# Patient Record
Sex: FEMALE | Race: NATIVE HAWAIIAN OR OTHER PACIFIC ISLANDER | NOT HISPANIC OR LATINO | URBAN - METROPOLITAN AREA
[De-identification: names, ages, dates, MRNs, and addresses within clinical notes are randomized per-mention and may not be internally consistent; named-entity substitution may affect disease eponyms.]

---

## 2017-11-27 ENCOUNTER — INPATIENT (INPATIENT)
Facility: HOSPITAL | Age: 32
LOS: 0 days | Discharge: HOME | DRG: 690 | End: 2017-11-28
Attending: STUDENT IN AN ORGANIZED HEALTH CARE EDUCATION/TRAINING PROGRAM | Admitting: STUDENT IN AN ORGANIZED HEALTH CARE EDUCATION/TRAINING PROGRAM
Payer: COMMERCIAL

## 2017-11-27 VITALS
TEMPERATURE: 98 F | DIASTOLIC BLOOD PRESSURE: 70 MMHG | OXYGEN SATURATION: 100 % | HEART RATE: 93 BPM | WEIGHT: 130.07 LBS | HEIGHT: 61 IN | SYSTOLIC BLOOD PRESSURE: 132 MMHG | RESPIRATION RATE: 18 BRPM

## 2017-11-27 LAB
ALBUMIN SERPL ELPH-MCNC: 4.6 G/DL — SIGNIFICANT CHANGE UP (ref 3.3–5)
ALP SERPL-CCNC: 80 U/L — SIGNIFICANT CHANGE UP (ref 40–120)
ALT FLD-CCNC: 18 U/L — SIGNIFICANT CHANGE UP (ref 10–45)
ANION GAP SERPL CALC-SCNC: 20 MMOL/L — HIGH (ref 5–17)
APPEARANCE UR: CLEAR — SIGNIFICANT CHANGE UP
AST SERPL-CCNC: 34 U/L — SIGNIFICANT CHANGE UP (ref 10–40)
BACTERIA # UR AUTO: PRESENT /HPF
BASOPHILS NFR BLD AUTO: 0.5 % — SIGNIFICANT CHANGE UP (ref 0–2)
BILIRUB SERPL-MCNC: 0.9 MG/DL — SIGNIFICANT CHANGE UP (ref 0.2–1.2)
BILIRUB UR-MCNC: NEGATIVE — SIGNIFICANT CHANGE UP
BUN SERPL-MCNC: 12 MG/DL — SIGNIFICANT CHANGE UP (ref 7–23)
CALCIUM SERPL-MCNC: 10.4 MG/DL — SIGNIFICANT CHANGE UP (ref 8.4–10.5)
CHLORIDE SERPL-SCNC: 95 MMOL/L — LOW (ref 96–108)
CO2 SERPL-SCNC: 21 MMOL/L — LOW (ref 22–31)
COLOR SPEC: YELLOW — SIGNIFICANT CHANGE UP
CREAT SERPL-MCNC: 0.85 MG/DL — SIGNIFICANT CHANGE UP (ref 0.5–1.3)
DIFF PNL FLD: (no result)
EOSINOPHIL NFR BLD AUTO: 7.6 % — HIGH (ref 0–6)
EPI CELLS # UR: (no result) /HPF (ref 0–5)
GLUCOSE SERPL-MCNC: 98 MG/DL — SIGNIFICANT CHANGE UP (ref 70–99)
GLUCOSE UR QL: 100
HCG SERPL-ACNC: <.1 MIU/ML — SIGNIFICANT CHANGE UP
HCT VFR BLD CALC: 41.8 % — SIGNIFICANT CHANGE UP (ref 34.5–45)
HGB BLD-MCNC: 13.5 G/DL — SIGNIFICANT CHANGE UP (ref 11.5–15.5)
KETONES UR-MCNC: NEGATIVE — SIGNIFICANT CHANGE UP
LEUKOCYTE ESTERASE UR-ACNC: NEGATIVE — SIGNIFICANT CHANGE UP
LIDOCAIN IGE QN: 40 U/L — SIGNIFICANT CHANGE UP (ref 7–60)
LYMPHOCYTES # BLD AUTO: 38.4 % — SIGNIFICANT CHANGE UP (ref 13–44)
MCHC RBC-ENTMCNC: 26.4 PG — LOW (ref 27–34)
MCHC RBC-ENTMCNC: 32.3 G/DL — SIGNIFICANT CHANGE UP (ref 32–36)
MCV RBC AUTO: 81.8 FL — SIGNIFICANT CHANGE UP (ref 80–100)
MONOCYTES NFR BLD AUTO: 7.1 % — SIGNIFICANT CHANGE UP (ref 2–14)
NEUTROPHILS NFR BLD AUTO: 46.4 % — SIGNIFICANT CHANGE UP (ref 43–77)
NITRITE UR-MCNC: POSITIVE
PH UR: 5.5 — SIGNIFICANT CHANGE UP (ref 5–8)
PLATELET # BLD AUTO: 331 K/UL — SIGNIFICANT CHANGE UP (ref 150–400)
POTASSIUM SERPL-MCNC: 5.2 MMOL/L — SIGNIFICANT CHANGE UP (ref 3.5–5.3)
POTASSIUM SERPL-SCNC: 5.2 MMOL/L — SIGNIFICANT CHANGE UP (ref 3.5–5.3)
PROT SERPL-MCNC: 9.3 G/DL — HIGH (ref 6–8.3)
PROT UR-MCNC: 30 MG/DL
RBC # BLD: 5.11 M/UL — SIGNIFICANT CHANGE UP (ref 3.8–5.2)
RBC # FLD: 12.7 % — SIGNIFICANT CHANGE UP (ref 10.3–16.9)
RBC CASTS # UR COMP ASSIST: > 10 /HPF
SODIUM SERPL-SCNC: 136 MMOL/L — SIGNIFICANT CHANGE UP (ref 135–145)
SP GR SPEC: 1.01 — SIGNIFICANT CHANGE UP (ref 1–1.03)
UROBILINOGEN FLD QL: 2 E.U./DL
WBC # BLD: 7.9 K/UL — SIGNIFICANT CHANGE UP (ref 3.8–10.5)
WBC # FLD AUTO: 7.9 K/UL — SIGNIFICANT CHANGE UP (ref 3.8–10.5)
WBC UR QL: < 5 /HPF — SIGNIFICANT CHANGE UP

## 2017-11-27 PROCEDURE — 93010 ELECTROCARDIOGRAM REPORT: CPT

## 2017-11-27 PROCEDURE — 99285 EMERGENCY DEPT VISIT HI MDM: CPT | Mod: 25

## 2017-11-27 PROCEDURE — 74177 CT ABD & PELVIS W/CONTRAST: CPT | Mod: 26

## 2017-11-27 RX ORDER — CEFTRIAXONE 500 MG/1
2 INJECTION, POWDER, FOR SOLUTION INTRAMUSCULAR; INTRAVENOUS ONCE
Qty: 0 | Refills: 0 | Status: COMPLETED | OUTPATIENT
Start: 2017-11-27 | End: 2017-11-27

## 2017-11-27 RX ORDER — SODIUM CHLORIDE 9 MG/ML
1000 INJECTION INTRAMUSCULAR; INTRAVENOUS; SUBCUTANEOUS ONCE
Qty: 0 | Refills: 0 | Status: COMPLETED | OUTPATIENT
Start: 2017-11-27 | End: 2017-11-27

## 2017-11-27 RX ORDER — PHENAZOPYRIDINE HCL 100 MG
0 TABLET ORAL
Qty: 0 | Refills: 0 | COMMUNITY

## 2017-11-27 RX ORDER — MORPHINE SULFATE 50 MG/1
4 CAPSULE, EXTENDED RELEASE ORAL ONCE
Qty: 0 | Refills: 0 | Status: DISCONTINUED | OUTPATIENT
Start: 2017-11-27 | End: 2017-11-27

## 2017-11-27 RX ORDER — SODIUM CHLORIDE 9 MG/ML
10001 INJECTION INTRAMUSCULAR; INTRAVENOUS; SUBCUTANEOUS ONCE
Qty: 0 | Refills: 0 | Status: DISCONTINUED | OUTPATIENT
Start: 2017-11-27 | End: 2017-11-27

## 2017-11-27 RX ORDER — HYDROMORPHONE HYDROCHLORIDE 2 MG/ML
1 INJECTION INTRAMUSCULAR; INTRAVENOUS; SUBCUTANEOUS ONCE
Qty: 0 | Refills: 0 | Status: DISCONTINUED | OUTPATIENT
Start: 2017-11-27 | End: 2017-11-27

## 2017-11-27 RX ORDER — ONDANSETRON 8 MG/1
4 TABLET, FILM COATED ORAL ONCE
Qty: 0 | Refills: 0 | Status: COMPLETED | OUTPATIENT
Start: 2017-11-27 | End: 2017-11-27

## 2017-11-27 RX ADMIN — MORPHINE SULFATE 4 MILLIGRAM(S): 50 CAPSULE, EXTENDED RELEASE ORAL at 19:59

## 2017-11-27 RX ADMIN — CEFTRIAXONE 100 GRAM(S): 500 INJECTION, POWDER, FOR SOLUTION INTRAMUSCULAR; INTRAVENOUS at 21:53

## 2017-11-27 RX ADMIN — SODIUM CHLORIDE 1000 MILLILITER(S): 9 INJECTION INTRAMUSCULAR; INTRAVENOUS; SUBCUTANEOUS at 21:53

## 2017-11-27 RX ADMIN — MORPHINE SULFATE 4 MILLIGRAM(S): 50 CAPSULE, EXTENDED RELEASE ORAL at 20:51

## 2017-11-27 RX ADMIN — SODIUM CHLORIDE 1000 MILLILITER(S): 9 INJECTION INTRAMUSCULAR; INTRAVENOUS; SUBCUTANEOUS at 19:59

## 2017-11-27 RX ADMIN — HYDROMORPHONE HYDROCHLORIDE 1 MILLIGRAM(S): 2 INJECTION INTRAMUSCULAR; INTRAVENOUS; SUBCUTANEOUS at 22:01

## 2017-11-27 RX ADMIN — ONDANSETRON 4 MILLIGRAM(S): 8 TABLET, FILM COATED ORAL at 19:58

## 2017-11-27 NOTE — ED PROVIDER NOTE - OBJECTIVE STATEMENT
31 yo female with hx of  chronic constipation recently seen and eval at [facility] in [place] 2 days ago- CT showed pyelo apparently per pt- she was offered admission but refused- was placed on Bactrim and discharged- continues to have increased abd pain over the past 2 days- pos flatus  - last BM 3-4 days ago- nausea  no vomiting

## 2017-11-27 NOTE — ED ADULT TRIAGE NOTE - CHIEF COMPLAINT QUOTE
Pt CO Abd Pain 10/10 worsening since Saturday.  Pt states "I was seen in another ER and told I have a UTI, and placed on Abx, but now its significantly worse."  Pt teary eyed during interview.  Pt denies N/V/D, SOB, Fevers, Dizziness at this time.  Pt self medicated with 3,500 mg PO Tylenol today with no change in pain.

## 2017-11-27 NOTE — ED PROVIDER NOTE - MEDICAL DECISION MAKING DETAILS
31 yo F with recent dx pyelo with increased right flank pain x 24 hr seen at [facility] now with increased pain await CT ? stone worsening pyelo

## 2017-11-28 VITALS
TEMPERATURE: 98 F | SYSTOLIC BLOOD PRESSURE: 97 MMHG | HEART RATE: 66 BPM | RESPIRATION RATE: 18 BRPM | OXYGEN SATURATION: 97 % | DIASTOLIC BLOOD PRESSURE: 58 MMHG

## 2017-11-28 DIAGNOSIS — N28.9 DISORDER OF KIDNEY AND URETER, UNSPECIFIED: ICD-10-CM

## 2017-11-28 DIAGNOSIS — M54.9 DORSALGIA, UNSPECIFIED: ICD-10-CM

## 2017-11-28 DIAGNOSIS — K59.00 CONSTIPATION, UNSPECIFIED: ICD-10-CM

## 2017-11-28 DIAGNOSIS — N30.00 ACUTE CYSTITIS WITHOUT HEMATURIA: ICD-10-CM

## 2017-11-28 DIAGNOSIS — N12 TUBULO-INTERSTITIAL NEPHRITIS, NOT SPECIFIED AS ACUTE OR CHRONIC: ICD-10-CM

## 2017-11-28 DIAGNOSIS — R63.8 OTHER SYMPTOMS AND SIGNS CONCERNING FOOD AND FLUID INTAKE: ICD-10-CM

## 2017-11-28 DIAGNOSIS — D72.1 EOSINOPHILIA: ICD-10-CM

## 2017-11-28 DIAGNOSIS — R01.1 CARDIAC MURMUR, UNSPECIFIED: ICD-10-CM

## 2017-11-28 DIAGNOSIS — E87.2 ACIDOSIS: ICD-10-CM

## 2017-11-28 DIAGNOSIS — Z29.9 ENCOUNTER FOR PROPHYLACTIC MEASURES, UNSPECIFIED: ICD-10-CM

## 2017-11-28 LAB
ANION GAP SERPL CALC-SCNC: 15 MMOL/L — SIGNIFICANT CHANGE UP (ref 5–17)
APPEARANCE UR: CLEAR — SIGNIFICANT CHANGE UP
BILIRUB UR-MCNC: NEGATIVE — SIGNIFICANT CHANGE UP
BUN SERPL-MCNC: 8 MG/DL — SIGNIFICANT CHANGE UP (ref 7–23)
CALCIUM SERPL-MCNC: 8.8 MG/DL — SIGNIFICANT CHANGE UP (ref 8.4–10.5)
CHLORIDE SERPL-SCNC: 101 MMOL/L — SIGNIFICANT CHANGE UP (ref 96–108)
CO2 SERPL-SCNC: 22 MMOL/L — SIGNIFICANT CHANGE UP (ref 22–31)
COLOR SPEC: YELLOW — SIGNIFICANT CHANGE UP
CREAT SERPL-MCNC: 0.85 MG/DL — SIGNIFICANT CHANGE UP (ref 0.5–1.3)
DIFF PNL FLD: (no result)
GLUCOSE SERPL-MCNC: 99 MG/DL — SIGNIFICANT CHANGE UP (ref 70–99)
GLUCOSE UR QL: NEGATIVE — SIGNIFICANT CHANGE UP
HCT VFR BLD CALC: 35.9 % — SIGNIFICANT CHANGE UP (ref 34.5–45)
HGB BLD-MCNC: 11.6 G/DL — SIGNIFICANT CHANGE UP (ref 11.5–15.5)
KETONES UR-MCNC: NEGATIVE — SIGNIFICANT CHANGE UP
LEUKOCYTE ESTERASE UR-ACNC: NEGATIVE — SIGNIFICANT CHANGE UP
MAGNESIUM SERPL-MCNC: 2.4 MG/DL — SIGNIFICANT CHANGE UP (ref 1.6–2.6)
MCHC RBC-ENTMCNC: 26.9 PG — LOW (ref 27–34)
MCHC RBC-ENTMCNC: 32.3 G/DL — SIGNIFICANT CHANGE UP (ref 32–36)
MCV RBC AUTO: 83.1 FL — SIGNIFICANT CHANGE UP (ref 80–100)
NITRITE UR-MCNC: POSITIVE
PH UR: 5 — SIGNIFICANT CHANGE UP (ref 5–8)
PLATELET # BLD AUTO: 318 K/UL — SIGNIFICANT CHANGE UP (ref 150–400)
POTASSIUM SERPL-MCNC: 4.2 MMOL/L — SIGNIFICANT CHANGE UP (ref 3.5–5.3)
POTASSIUM SERPL-SCNC: 4.2 MMOL/L — SIGNIFICANT CHANGE UP (ref 3.5–5.3)
PROT UR-MCNC: NEGATIVE MG/DL — SIGNIFICANT CHANGE UP
RBC # BLD: 4.32 M/UL — SIGNIFICANT CHANGE UP (ref 3.8–5.2)
RBC # FLD: 12.7 % — SIGNIFICANT CHANGE UP (ref 10.3–16.9)
SODIUM SERPL-SCNC: 138 MMOL/L — SIGNIFICANT CHANGE UP (ref 135–145)
SP GR SPEC: <=1.005 — SIGNIFICANT CHANGE UP (ref 1–1.03)
UROBILINOGEN FLD QL: 1 E.U./DL — SIGNIFICANT CHANGE UP
WBC # BLD: 7 K/UL — SIGNIFICANT CHANGE UP (ref 3.8–10.5)
WBC # FLD AUTO: 7 K/UL — SIGNIFICANT CHANGE UP (ref 3.8–10.5)

## 2017-11-28 PROCEDURE — 96374 THER/PROPH/DIAG INJ IV PUSH: CPT | Mod: XU

## 2017-11-28 PROCEDURE — 85025 COMPLETE CBC W/AUTO DIFF WBC: CPT

## 2017-11-28 PROCEDURE — 80053 COMPREHEN METABOLIC PANEL: CPT

## 2017-11-28 PROCEDURE — 85027 COMPLETE CBC AUTOMATED: CPT

## 2017-11-28 PROCEDURE — 83690 ASSAY OF LIPASE: CPT

## 2017-11-28 PROCEDURE — 84702 CHORIONIC GONADOTROPIN TEST: CPT

## 2017-11-28 PROCEDURE — 80048 BASIC METABOLIC PNL TOTAL CA: CPT

## 2017-11-28 PROCEDURE — 87086 URINE CULTURE/COLONY COUNT: CPT

## 2017-11-28 PROCEDURE — 87040 BLOOD CULTURE FOR BACTERIA: CPT

## 2017-11-28 PROCEDURE — 96375 TX/PRO/DX INJ NEW DRUG ADDON: CPT

## 2017-11-28 PROCEDURE — 93005 ELECTROCARDIOGRAM TRACING: CPT

## 2017-11-28 PROCEDURE — 99223 1ST HOSP IP/OBS HIGH 75: CPT | Mod: GC

## 2017-11-28 PROCEDURE — 81001 URINALYSIS AUTO W/SCOPE: CPT

## 2017-11-28 PROCEDURE — 83735 ASSAY OF MAGNESIUM: CPT

## 2017-11-28 PROCEDURE — 36415 COLL VENOUS BLD VENIPUNCTURE: CPT

## 2017-11-28 PROCEDURE — 12345: CPT | Mod: GC,NC

## 2017-11-28 PROCEDURE — 99285 EMERGENCY DEPT VISIT HI MDM: CPT | Mod: 25

## 2017-11-28 PROCEDURE — 80076 HEPATIC FUNCTION PANEL: CPT

## 2017-11-28 PROCEDURE — 74177 CT ABD & PELVIS W/CONTRAST: CPT

## 2017-11-28 RX ORDER — NITROFURANTOIN MACROCRYSTAL 50 MG
100 CAPSULE ORAL EVERY 12 HOURS
Qty: 0 | Refills: 0 | Status: DISCONTINUED | OUTPATIENT
Start: 2017-11-28 | End: 2017-11-28

## 2017-11-28 RX ORDER — KETOROLAC TROMETHAMINE 30 MG/ML
15 SYRINGE (ML) INJECTION EVERY 6 HOURS
Qty: 0 | Refills: 0 | Status: DISCONTINUED | OUTPATIENT
Start: 2017-11-28 | End: 2017-11-28

## 2017-11-28 RX ORDER — LIDOCAINE 4 G/100G
1 CREAM TOPICAL ONCE
Qty: 0 | Refills: 0 | Status: COMPLETED | OUTPATIENT
Start: 2017-11-28 | End: 2017-11-28

## 2017-11-28 RX ORDER — SENNA PLUS 8.6 MG/1
2 TABLET ORAL AT BEDTIME
Qty: 0 | Refills: 0 | Status: DISCONTINUED | OUTPATIENT
Start: 2017-11-28 | End: 2017-11-28

## 2017-11-28 RX ORDER — AZTREONAM 2 G
1 VIAL (EA) INJECTION
Qty: 0 | Refills: 0 | COMMUNITY

## 2017-11-28 RX ORDER — CEFTRIAXONE 500 MG/1
1 INJECTION, POWDER, FOR SOLUTION INTRAMUSCULAR; INTRAVENOUS EVERY 24 HOURS
Qty: 0 | Refills: 0 | Status: DISCONTINUED | OUTPATIENT
Start: 2017-11-28 | End: 2017-11-28

## 2017-11-28 RX ORDER — MULTIVIT WITH MIN/MFOLATE/K2 340-15/3 G
100 POWDER (GRAM) ORAL DAILY
Qty: 0 | Refills: 0 | Status: DISCONTINUED | OUTPATIENT
Start: 2017-11-28 | End: 2017-11-28

## 2017-11-28 RX ORDER — POLYETHYLENE GLYCOL 3350 17 G/17G
17 POWDER, FOR SOLUTION ORAL EVERY 12 HOURS
Qty: 0 | Refills: 0 | Status: DISCONTINUED | OUTPATIENT
Start: 2017-11-28 | End: 2017-11-28

## 2017-11-28 RX ORDER — NORETHINDRONE AND ETHINYL ESTRADIOL 0.4-0.035
1 KIT ORAL
Qty: 0 | Refills: 0 | COMMUNITY

## 2017-11-28 RX ORDER — POLYETHYLENE GLYCOL 3350 17 G/17G
17 POWDER, FOR SOLUTION ORAL
Qty: 3 | Refills: 1 | OUTPATIENT
Start: 2017-11-28 | End: 2018-01-26

## 2017-11-28 RX ADMIN — Medication 1 TABLET(S): at 15:34

## 2017-11-28 RX ADMIN — SENNA PLUS 2 TABLET(S): 8.6 TABLET ORAL at 02:14

## 2017-11-28 RX ADMIN — Medication 15 MILLIGRAM(S): at 11:30

## 2017-11-28 RX ADMIN — Medication 5 MILLIGRAM(S): at 11:30

## 2017-11-28 RX ADMIN — Medication 100 MILLILITER(S): at 02:44

## 2017-11-28 RX ADMIN — HYDROMORPHONE HYDROCHLORIDE 1 MILLIGRAM(S): 2 INJECTION INTRAMUSCULAR; INTRAVENOUS; SUBCUTANEOUS at 00:00

## 2017-11-28 NOTE — H&P ADULT - PROBLEM SELECTOR PLAN 5
IMPROVE 0, low risk for VTE, can use SCDs     FULL CODE seen on CT; will need further evaluation as outpatient with repeat renal ultrasound

## 2017-11-28 NOTE — H&P ADULT - PROBLEM SELECTOR PLAN 3
Regular diet  Replete lytes PRN (Mg>2, K>4) Pt with chronic constipation that has been worked up in the past. Passing flatus, last BM this AM. CT A/P showing copious colonic stool with small bowel fecalization compatible with constipation/stasis.  - Mag citrate and senna daily  - will add additional agents (dulcolax, miralax if pt continues to be constipated)

## 2017-11-28 NOTE — H&P ADULT - PMH
Chronic idiopathic constipation    UTI (urinary tract infection) Chronic idiopathic constipation    Gastroesophageal reflux disease, esophagitis presence not specified    Murmur, cardiac    UTI (urinary tract infection)

## 2017-11-28 NOTE — H&P ADULT - NSHPLABSRESULTS_GEN_ALL_CORE
.  LABS:                         13.5   7.9   )-----------( 331      ( 2017 20:02 )             41.8         136  |  95<L>  |  12  ----------------------------<  98  5.2   |  21<L>  |  0.85    Ca    10.4      2017 20:02    TPro  9.3<H>  /  Alb  4.6  /  TBili  0.9  /  DBili  x   /  AST  34  /  ALT  18  /  AlkPhos  80        Urinalysis Basic - ( 2017 21:03 )    Color: Yellow / Appearance: Clear / S.010 / pH: x  Gluc: x / Ketone: NEGATIVE  / Bili: Negative / Urobili: 2.0 E.U./dL   Blood: x / Protein: 30 mg/dL / Nitrite: POSITIVE   Leuk Esterase: NEGATIVE / RBC: > 10 /HPF / WBC < 5 /HPF   Sq Epi: x / Non Sq Epi: Moderate /HPF / Bacteria: Present /HPF        RADIOLOGY, EKG & ADDITIONAL TESTS: Reviewed.     CT A/P:     FINDINGS:    Lower chest: Clear lung bases.     Liver: Smooth in contour. No focal mass. Portal and hepatic veins are   patent.    Biliary system: Gallbladder is normal in size. No calcified gallstones.   No biliary ductal dilatation.    Pancreas: Unremarkable.    Spleen: Unremarkable.    Adrenal glands: Unremarkable.    Kidneys: Symmetric parenchymal enhancement without CT evidence of acute   pyelonephritis. There is a 1.0 cm heterogeneously hypodense lesion at the   upper pole left kidney. Multiple subcentimeter hypoattenuating lesions in   both kidneys are too small to further characterize. No hydronephrosis. No   renal or ureteral stone.     Urinary Bladder: Unremarkable.  Reproductive organs: Unremarkable.     Bowel/Peritoneum: Somewhat limited due to lack of oral contrast. Copious   colonic stool with small bowel fecalization compatible with   constipation/stasis. No evidence of obstruction. No appreciable wall   thickening. The appendix is not clearly identified however no dilated   tubular structures are seen within the right lower quadrant to suggest   acute appendicitis. No ascites or extraluminal gas.    Lymph nodes: No lymphadenopathy.  Aorta/IVC: Normal caliber.  Abdominal wall: Small fat-containing umbilical hernia.  Bones/Soft tissues: No acute abnormality.      IMPRESSION:   1.  No CT evidence of acute pyelonephritis, nephrolithiasis or   hydronephrosis.  2.  1.0 cm heterogeneously hypodense lesion of the upper pole left   kidney. Further evaluation with nonemergent ultrasound or   contrast-enhanced MRI is recommended.  3.  Copious colonic stool with small bowel fecalization compatible with   constipation/stasis.

## 2017-11-28 NOTE — DISCHARGE NOTE ADULT - OTHER SIGNIFICANT FINDINGS
CT Abdomen and Pelvis w/ IV Cont (11.27.17 @ 21:29) >  1.  No nephrolithiasis or hydronephrosis. No CT evidence of   pyelonephritis.  2.  1.0 cm heterogeneous hypodense lesion in the left kidney. Further   evaluation with nonemergent ultrasound or contrast-enhanced MRI is   recommended.  3.  Copious right-sided colonic stool with small bowel fecalization   suggestive of constipation/stasis.

## 2017-11-28 NOTE — DISCHARGE NOTE ADULT - PLAN OF CARE
Please continue to take bactrim for a total of 10 days You came to hospital with increased urinary frequency and urgency. You have uncomplicated urinary tract infection. There is no signs of pyelonephritis: inflammation of the kidneys. Please continue to take bactrim for a total of 10 days as you have been doing. Please follow-up with your gastroenterologist You came to the hospital with right flank pain. There is no signs of pyelonephritis: inflammation of the kidneys. CT scan of your abdomen showed copious amount of stool in the right sided colon, likely contributing the pain. Please follow-up with your gastroenterologist. A 1.0 cm heterogeneous hypodense lesion in the left kidney was incidentally found on your CT scan. An ultrasound or contrast-enhanced MRI is recommended. Please follow-up with your primary care doctor for further management. You came to hospital with increased urinary frequency and urgency. You have uncomplicated urinary tract infection. There is no signs of pyelonephritis: inflammation of the kidneys. Please continue to take bactrim 800/160 twice daily for a total of 10 days as you have been doing.

## 2017-11-28 NOTE — PROGRESS NOTE ADULT - PROBLEM SELECTOR PLAN 4
-possibly drug induced given bactrim ds use;   -monitor CBC -resolved  -possibly drug induced given bactrim ds use;   -monitor CBC

## 2017-11-28 NOTE — PROGRESS NOTE ADULT - PROBLEM SELECTOR PLAN 1
-history consistent with pyelonephritis with UTI and CVA tenderness but pt met 0/4 SIRS criteria, WBC wnl, UA negative and CT A/P preliminary read is negative for acute pyelonephritis, kidney stones, or hydronephrosis.   -DDx: pylenonephritis, ectopic pregenancy, appendicitis, choleycystitis   -no DM, pregnancy, MOHAN/CKD, obstruction, catheter or immunocompromise   -previously had UTI in October treated with cipro and currently on bactrim  -UC from 11/25 at [facility] [facility], CT grew staph saprophyticus and it was sensitive to bactrim, vancomycin, tetracycline and nitrofurantoin.  -will continue to treat with Ceftriaxone 1g q 24hrs for suspicion of pyelonephritis  - will treat pain with Toradol 15mg IV q6hrs PRN, will avoid opioids in setting of constipation  -f/u Urine Cx from [facility] for speciation and sensitivities  -will switch to bactrim DS q12 upon discharge -patient has uncomplicated UTI  -WBC wnl, UA negative and CT A/P preliminary read is negative for acute pyelonephritis, kidney stones, or hydronephrosis.   -DDx: UTI, pyelonephritis, ectopic pregenancy, appendicitis, choleycystitis   -no DM, pregnancy, MOHAN/CKD, obstruction, catheter or immunocompromise   -previously had UTI in October treated with cipro and currently on bactrim  -UC from 11/25 at [facility], CT grew staph saprophyticus and it was sensitive to bactrim, vancomycin, tetracycline and nitrofurantoin.  -will continue to treat with Ceftriaxone 1g q 24hrs for suspicion of pyelonephritis  - will treat pain with Toradol 15mg IV q6hrs PRN, will avoid opioids in setting of constipation  -f/u Urine Cx from [facility] for speciation and sensitivities  -will switch to bactrim DS q12 upon discharge

## 2017-11-28 NOTE — DISCHARGE NOTE ADULT - CARE PLAN
Principal Discharge DX:	Acute cystitis without hematuria  Goal:	Please continue to take bactrim for a total of 10 days  Instructions for follow-up, activity and diet:	You came to hospital with increased urinary frequency and urgency. You have uncomplicated urinary tract infection. There is no signs of pyelonephritis: inflammation of the kidneys. Please continue to take bactrim for a total of 10 days as you have been doing.  Secondary Diagnosis:	Constipation, unspecified constipation type  Goal:	Please follow-up with your gastroenterologist  Instructions for follow-up, activity and diet:	You came to the hospital with right flank pain. There is no signs of pyelonephritis: inflammation of the kidneys. CT scan of your abdomen showed copious amount of stool in the right sided colon, likely contributing the pain. Please follow-up with your gastroenterologist. Principal Discharge DX:	Acute cystitis without hematuria  Goal:	Please continue to take bactrim for a total of 10 days  Instructions for follow-up, activity and diet:	You came to hospital with increased urinary frequency and urgency. You have uncomplicated urinary tract infection. There is no signs of pyelonephritis: inflammation of the kidneys. Please continue to take bactrim for a total of 10 days as you have been doing.  Secondary Diagnosis:	Constipation, unspecified constipation type  Goal:	Please follow-up with your gastroenterologist  Instructions for follow-up, activity and diet:	You came to the hospital with right flank pain. There is no signs of pyelonephritis: inflammation of the kidneys. CT scan of your abdomen showed copious amount of stool in the right sided colon, likely contributing the pain. Please follow-up with your gastroenterologist.  Secondary Diagnosis:	Kidney lesion, native, left  Instructions for follow-up, activity and diet:	A 1.0 cm heterogeneous hypodense lesion in the left kidney was incidentally found on your CT scan. An ultrasound or contrast-enhanced MRI is recommended. Please follow-up with your primary care doctor for further management. Principal Discharge DX:	Acute cystitis without hematuria  Goal:	Please continue to take bactrim for a total of 10 days  Instructions for follow-up, activity and diet:	You came to hospital with increased urinary frequency and urgency. You have uncomplicated urinary tract infection. There is no signs of pyelonephritis: inflammation of the kidneys. Please continue to take bactrim 800/160 twice daily for a total of 10 days as you have been doing.  Secondary Diagnosis:	Constipation, unspecified constipation type  Goal:	Please follow-up with your gastroenterologist  Instructions for follow-up, activity and diet:	You came to the hospital with right flank pain. There is no signs of pyelonephritis: inflammation of the kidneys. CT scan of your abdomen showed copious amount of stool in the right sided colon, likely contributing the pain. Please follow-up with your gastroenterologist.  Secondary Diagnosis:	Kidney lesion, native, left  Instructions for follow-up, activity and diet:	A 1.0 cm heterogeneous hypodense lesion in the left kidney was incidentally found on your CT scan. An ultrasound or contrast-enhanced MRI is recommended. Please follow-up with your primary care doctor for further management.

## 2017-11-28 NOTE — H&P ADULT - NSHPSOCIALHISTORY_GEN_ALL_CORE
Never smoker  Drinks 1 alcoholic every 2-3 weeks  No illicit drug use Never smoker  Drinks 1 alcoholic every 2-3 weeks  No illicit drug use  lives with , works as a

## 2017-11-28 NOTE — DISCHARGE NOTE ADULT - CARE PROVIDER_API CALL
Hollis Sotelo), Medicine  [address]   [address]  [place] [address]  Phone: [phone]  Fax: [phone]    Hubert Ann,   [address] at [address] [place] [address]  Phone: [phone]  Fax: (   )    -

## 2017-11-28 NOTE — H&P ADULT - ASSESSMENT
FINDINGS:    Lower chest: Clear lung bases.     Liver: Smooth in contour. No focal mass. Portal and hepatic veins are   patent.    Biliary system: Gallbladder is normal in size. No calcified gallstones.   No biliary ductal dilatation.    Pancreas: Unremarkable.    Spleen: Unremarkable.    Adrenal glands: Unremarkable.    Kidneys: Symmetric parenchymal enhancement without CT evidence of acute   pyelonephritis. There is a 1.0 cm heterogeneously hypodense lesion at the   upper pole left kidney. Multiple subcentimeter hypoattenuating lesions in   both kidneys are too small to further characterize. No hydronephrosis. No   renal or ureteral stone.     Urinary Bladder: Unremarkable.    Reproductive organs: Unremarkable.     Bowel/Peritoneum: Somewhat limited due to lack of oral contrast. Copious   colonic stool with small bowel fecalization compatible with   constipation/stasis. No evidence of obstruction. No appreciable wall   thickening. The appendix is not clearly identified however no dilated   tubular structures are seen within the right lower quadrant to suggest   acute appendicitis. No ascites or extraluminal gas.    Lymph nodes: No lymphadenopathy.    Aorta/IVC: Normal caliber.    Abdominal wall: Small fat-containing umbilical hernia.    Bones/Soft tissues: No acute abnormality.        IMPRESSION:   1.  No CT evidence of acute pyelonephritis, nephrolithiasis or   hydronephrosis.  2.  1.0 cm heterogeneously hypodense lesion of the upper pole left   kidney. Further evaluation with nonemergent ultrasound or   contrast-enhanced MRI is recommended.  3.  Copious colonic stool with small bowel fecalization compatible with   constipation/stasis.

## 2017-11-28 NOTE — PROGRESS NOTE ADULT - PROBLEM SELECTOR PLAN 6
-recently dxd per patient; will check EKG  -follow up w/ PCP /outside cardiologist for further evaluation

## 2017-11-28 NOTE — PROGRESS NOTE ADULT - PROBLEM SELECTOR PLAN 3
-Pt with chronic constipation that has been worked up in the past. Passing flatus, last BM yesterday. CT A/P showing copious colonic stool with small bowel fecalization compatible with constipation/stasis.  -c/w Mag citrate and senna daily  -added miralax today  - will add additional agents (dulcolax, miralax if pt continues to be constipated) -resolved  -in setting of decreased PO intake s/p IVF boluses in ED; monitor BMP

## 2017-11-28 NOTE — H&P ADULT - NSHPPHYSICALEXAM_GEN_ALL_CORE
.  VITAL SIGNS:  T(C): 36.7 (11-28-17 @ 01:25), Max: 36.8 (11-27-17 @ 20:06)  T(F): 98 (11-28-17 @ 01:25), Max: 98.3 (11-27-17 @ 20:06)  HR: 72 (11-28-17 @ 01:25) (72 - 93)  BP: 111/70 (11-28-17 @ 01:25) (105/67 - 132/70)  BP(mean): --  RR: 18 (11-28-17 @ 01:25) (18 - 18)  SpO2: 98% (11-28-17 @ 01:25) (95% - 100%)  Wt(kg): --    PHYSICAL EXAM:    Constitutional: WDWN resting comfortably in bed; NAD  Head: NC/AT  Eyes: PERRLA, EOMI, clear conjunctiva  ENT: no nasal discharge; no oropharyngeal erythema or exudates; dry lips, moist oral mucosa  Neck: supple; no JVD or thyromegaly  Respiratory: CTA B/L; no W/R/R, no retractions  Cardiac: RRR, split S1, normal S2; no M/R/G; PMI non-displaced  Gastrointestinal: soft, distended, tympanic, discomfort with palpation but nontender; no rebound or guarding; +BS, no hepatosplenomegaly  Back: bilateral CVA  tenderness, R>L  Extremities: WWP, no clubbing or cyanosis; no peripheral edema  Vascular: 2+ radial, DP/PT pulses B/L  Lymphatic: no submandibular or cervical LAD  Neurologic: AAOx3; answers questions appropriately, follows commands, moves all extremities. Strength intact, 5/5 in UE and LE b/l, sensation intact

## 2017-11-28 NOTE — H&P ADULT - FAMILY HISTORY
Father  Still living? Unknown  Family history of diabetes mellitus in father, Age at diagnosis: Age Unknown  Family history of CABG, Age at diagnosis: Age Unknown  Family history of heart failure, Age at diagnosis: Age Unknown     Sibling  Still living? Unknown  Family history of renal failure, Age at diagnosis: Age Unknown  Family history of kidney stones, Age at diagnosis: Age Unknown

## 2017-11-28 NOTE — PROGRESS NOTE ADULT - SUBJECTIVE AND OBJECTIVE BOX
INTERVAL HPI/OVERNIGHT EVENTS: Admitted overnight. Pt was at [facility] ED last Saturday 11/25 with sepsis 2/2 UTI. UC grew staph saprophyticus and it was sensitive to bactrim, vancomycin, tetracycline and nitrofurantoin. CT A/P unremarkable. Pt was sent home with bactrim and phenazopyridine. Patient had a UTI last Octrober which was treated with ciprofloxacin, but didn't follow-up with her PMD afterwards.       SUBJECTIVE: Patient seen and examined at bedside. Last BM on Monday. Voiding well, urgency and frequency improved. No burning sensation. Reports chills, nausea, right flank pain and right sided abdominal pain. No fever, SOB or chest pain.      OBJECTIVE:  VITAL SIGNS:  ICU Vital Signs Last 24 Hrs  T(C): 36.7 (28 Nov 2017 05:56), Max: 36.8 (27 Nov 2017 20:06)  T(F): 98.1 (28 Nov 2017 05:56), Max: 98.3 (27 Nov 2017 20:06)  HR: 79 (28 Nov 2017 05:56) (72 - 93)  BP: 112/74 (28 Nov 2017 05:56) (105/67 - 132/70)  BP(mean): --  ABP: --  ABP(mean): --  RR: 18 (28 Nov 2017 05:56) (18 - 18)  SpO2: 98% (28 Nov 2017 05:56) (95% - 100%)    CAPILLARY BLOOD GLUCOSE    PHYSICAL EXAM:  General: NAD  HEENT: NC/AT; PERRL, clear conjunctiva, moist mucosa  Neck: supple  Respiratory: CTA b/l  Cardiovascular: +S1/S2; RRR  Abdomen: soft, ND; +BS x4. Diffuse tenderness on the right side of the abdomen.   Extremities: WWP, 2+ peripheral pulses b/l; no LE edema  Skin: normal color and turgor; no rash  Neurological: AAOx3, CN ii-xii intact, no focal deficits    MEDICATIONS:  MEDICATIONS  (STANDING):  cefTRIAXone   IVPB 1 Gram(s) IV Intermittent every 24 hours  magnesium citrate Solution 100 milliLiter(s) Oral daily  senna 2 Tablet(s) Oral at bedtime    MEDICATIONS  (PRN):  ketorolac   Injectable 15 milliGRAM(s) IV Push every 6 hours PRN Severe Pain (7 - 10)      ALLERGIES:  Allergies    No Known Allergies    Intolerances        LABS:                        11.6   7.0   )-----------( 318      ( 28 Nov 2017 06:05 )             35.9     11-28    138  |  101  |  8   ----------------------------<  99  4.2   |  22  |  0.85    Ca    8.8      28 Nov 2017 06:05  Mg     2.4     11-28    TPro  9.3<H>  /  Alb  4.6  /  TBili  0.9  /  DBili  x   /  AST  34  /  ALT  18  /  AlkPhos  80  11-27      Urinalysis Basic - ( 28 Nov 2017 01:23 )    Color: Yellow / Appearance: Clear / SG: <=1.005 / pH: x  < from: CT Abdomen and Pelvis w/ IV Cont (11.27.17 @ 21:29) >  FINDINGS:    Lower chest: Clear lung bases.     Liver: Smooth in contour. No focal lesion. Nonspecific mild heterogeneous   attenuation of right hepatic lobe and left hepatic dome is likely related   to phase of imaging. Proximal hepatic veins are patent. Portal veins are   patent.    Biliary system: Gallbladder is normal in size. No calcified gallstones.   No biliary ductal dilatation.    Pancreas: Unremarkable.    Spleen: Unremarkable.    Adrenal glands: Unremarkable.    Kidneys: Symmetric and homogeneous parenchymal enhancement. No   perinephric fat stranding. 1.0 cm heterogeneous hypodense lesion in the   left upper pole. Multiple subcentimeter hypoattenuating lesions in left   greater than right kidneys are too small to further characterize. No   hydronephrosis. No renal or ureteral stone.     Urinary Bladder: Unremarkable.    Reproductive organs: Unremarkable.     Bowel/Peritoneum: Somewhat limited due to lack of oral contrast. Normal   bowel caliber. No appreciable colonic wall thickening. Normal appendix.   Copious right-sided colonic stool with fecalization of multiple distal   small bowel loops. No ascites or extraluminal gas.    Lymph nodes: No lymphadenopathy.    Aorta/IVC: Normal caliber.    Bones/Soft tissues: No significant abnormality. Mild thoracolumbar   levocurvature.      IMPRESSION:   1.  No nephrolithiasis or hydronephrosis. No CT evidence of   pyelonephritis.  2.  1.0 cm heterogeneous hypodense lesion in the left kidney. Further   evaluation with nonemergent ultrasound or contrast-enhanced MRI is   recommended.  3.  Copious right-sided colonic stool with small bowel fecalization   suggestive of constipation/stasis.    < end of copied text >  Gluc: x / Ketone: NEGATIVE  / Bili: Negative / Urobili: 1.0 E.U./dL   Blood: x / Protein: NEGATIVE mg/dL / Nitrite: POSITIVE   Leuk Esterase: NEGATIVE / RBC: 5-10 /HPF / WBC < 5 /HPF   Sq Epi: x / Non Sq Epi: Moderate /HPF / Bacteria: Present /HPF        RADIOLOGY & ADDITIONAL TESTS: Reviewed.

## 2017-11-28 NOTE — DISCHARGE NOTE ADULT - PATIENT PORTAL LINK FT
“You can access the [facility] Patient Portal, offered by [facility], by registering with the following website: [email or web address]”

## 2017-11-28 NOTE — H&P ADULT - NSHPREVIEWOFSYSTEMS_GEN_ALL_CORE
REVIEW OF SYSTEMS:    CONSTITUTIONAL: Intermittent chills, No weakness or fevers   EYES/ENT: No visual changes;  No vertigo or throat pain   NECK: No pain or stiffness  RESPIRATORY: No cough, wheezing, hemoptysis; No shortness of breath  CARDIOVASCULAR: No chest pain or palpitations  GASTROINTESTINAL: Abdominal discomfort and chronic constipation. No epigastric pain. No nausea, vomiting, or hematemesis; No melena or hematochezia.  GENITOURINARY: Increased frequency and urgency. No dysuria or hematuria  NEUROLOGICAL: No numbness or weakness  SKIN: No itching, burning, rashes, or lesions   All other review of systems is negative unless indicated above.

## 2017-11-28 NOTE — DISCHARGE NOTE ADULT - HOSPITAL COURSE
33 yo F with PMHx of chronic constipation, UTI presents to ED with worsening back pain, increased urinary frequency and urgency, chills x 3 days.     It is unclear whether the CT scan showed pyelonephritis or not, she was discharged from the ED on Bactrim and phenazopyridine. This morning, her back pain became excruciating, 10/10, non-radiating, constant, with no alleviating or exacerbating factors, which prompted her to go to the ED. She also endorses decreased appetite, PO intake, nausea, and some dry heaving, but no vomiting. Pt denies fevers, weight loss, trauma, dysuria, or hematuria. Of note, pt was also treated for UTI a month ago with Cipro. 31 yo F with PMHx of chronic constipation, UTI presents to ED with worsening right flank pain, increased urinary frequency and urgency, chills x 3 days. CTAP unremarkable for pyelonephritis, but had copious right-sided colonic stool. (Off note, patient was recently at [facility] ED four days for sepsis 2/2 UTI. UC grew staphylococcus saprophyticus sensitive to bactrim, vancomycin, tetracycline and macrobid. CTAP didn't show pyelonephritis. Pt was sent home with bactrim and phenazopyridine. Patient had a UTI last October which was treated with ciprofloxacin, but didn't follow-up with her PMD afterwards). Patient was initially treated with ceftriaxone and then switched to bactrim based on sensitivity studies from [facility]. Her flank pain is likely attributed to the right-sided colonic stool. Her urinary sxs are attributed to uncomplicated UTI. Patient is discharged with a ten day course of bactrim (DS q12 for total 10 days). Pt will follow-up with Dr. Sotelo for further work-up of chronic constipation.

## 2017-11-28 NOTE — H&P ADULT - PROBLEM SELECTOR PLAN 4
IMPROVE 0, low risk for VTE, can use SCDs     FULL CODE Regular diet  Replete lytes PRN (Mg>2, K>4) possibly drug induced given bactrim ds use; monitor CBC for now possibly drug induced given bactrim ds use; monitor CBC for now; hold bactrim ds for now

## 2017-11-28 NOTE — H&P ADULT - PROBLEM SELECTOR PLAN 1
Unclear etiology at this point. History consistent with pyelonephritis with UTI and CVA tenderness but pt met 0/4 SIRS criteria, WBC wnl, UTI contaminated with moderate epithelial cells, but no WBC, positive nitrite but negative LE, some RBCs. CT A/P is negative for acute pyelonephritis, History consistent with pyelonephritis with UTI and CVA tenderness but pt met 0/4 SIRS criteria, WBC wnl, UA negative and CT A/P is negative for acute pyelonephritis, kidney stones, or hydronephrosis. Could be musculoskeletal pain, no alarming signs  - would hold off on additional Abx at this point  - will treat pain with lidocaine patch, ibuprofen, or tylenol PRN  - avoid opioids in setting of constipation History consistent with pyelonephritis with UTI and CVA tenderness but pt met 0/4 SIRS criteria, WBC wnl, UA negative and CT A/P preliminary read is negative for acute pyelonephritis, kidney stones, or hydronephrosis. Could be musculoskeletal pain, no alarming signs  - will continue to treat with Ceftriaxone 1g q 24hrs for suspicion of pyelonephritis  - will treat pain with lidocaine patch and Toradol 15mg IV q6hrs PRN  - avoid opioids in setting of constipation  - f/u Urine Cx for speciation and sensitivities History consistent with pyelonephritis with UTI and CVA tenderness but pt met 0/4 SIRS criteria, WBC wnl, UA negative and CT A/P preliminary read is negative for acute pyelonephritis, kidney stones, or hydronephrosis. Could be musculoskeletal pain, no alarming signs  - will continue to treat with Ceftriaxone 1g q 24hrs for suspicion of pyelonephritis  - will treat pain with Toradol 15mg IV q6hrs PRN  - avoid opioids in setting of constipation  - f/u Urine Cx for speciation and sensitivities History consistent with pyelonephritis with UTI and CVA tenderness but pt met 0/4 SIRS criteria, WBC wnl, UA negative and CT A/P preliminary read is negative for acute pyelonephritis, kidney stones, or hydronephrosis.   - will continue to treat with Ceftriaxone 1g q 24hrs for suspicion of pyelonephritis  - will treat pain with Toradol 15mg IV q6hrs PRN  - avoid opioids in setting of constipation  - f/u Urine Cx for speciation and sensitivities

## 2017-11-28 NOTE — H&P ADULT - PROBLEM SELECTOR PLAN 6
recently dxd per patient; will check EKG, follow up w/ PCP /outside cardiologist for further evaluation.

## 2017-11-28 NOTE — PROGRESS NOTE ADULT - PROBLEM SELECTOR PLAN 2
-resolved  -in setting of decreased PO intake s/p IVF boluses in ED; monitor BMP -Pt with chronic constipation that has been worked up in the past. Passing flatus, last BM yesterday. CT A/P showing copious colonic stool in right side with small bowel fecalization compatible with constipation/stasis.  -likely contributing the right sided flank pain  -c/w Mag citrate and senna daily  -added miralax and dulcolax today

## 2017-11-28 NOTE — H&P ADULT - ATTENDING COMMENTS
patient seen and examined; pt reports being treated with cipro for UTI sxs last month; sxs recurred on Tuesday worsening on way back from in-laws place (went for Thanksgiving) prompting pt to be evaluated on way back to [facility]. Pt reports having elevated WBC, underwent CT scan and told of having UTI, dcd on bactrim. Pain relieved w/ motrin. Pt started abx on Saturday but pain recurred next day. pt reports having renal ultrasound done twice in past, no mention of L upper pole kidney lesion on previous reports per pt recall.                                                                                                                                                                                     reviewed vs, labs, CT a/p report  LMP: on friday    agree w/ PE findings as above w/ additions/ exceptions/ pertinent findings: pt appears uncomfortable  but able to talk in full sentences; MMM, perrla, no photophobia b/l; +generalized abdominal discomfort noted on abdominal examination, no guarding, no rebound; there is mild CVA tenderness b/l R>L ; no lower ext edema/ tenderness    1.  UTI r/o pyelonephritis: will c/w ceftriaxone as pt failed bactrim therapy, follow up ctxs, appears euvolemic currently ; compare previous CT reports w/ one done at [facility] ED                                                 2.   monitor BMP in setting of AG metabolic acidosis s/p IV fluid boluses  3.   constipation: plan as above  4.  monitor cbc in setting of eosinophilia patient seen and examined; pt reports being treated with cipro for UTI sxs last month; sxs recurred on Tuesday worsening on way back from in-laws place (went for Thanksgiving) prompting pt to be evaluated on way back to [facility]. Pt reports having elevated WBC, underwent CT scan and told of having UTI, dcd on bactrim. Pain relieved w/ motrin. Pt started abx on Saturday but pain recurred next day. pt reports having renal ultrasound done twice in past, no mention of L upper pole kidney lesion on previous reports per pt recall.                                                                                                                                                                                     reviewed vs, labs, CT a/p report  LMP: on friday    agree w/ PE findings as above w/ additions/ exceptions/ pertinent findings: pt appears uncomfortable  but able to talk in full sentences; MMM, perrla, no photophobia b/l; +generalized abdominal discomfort noted on abdominal examination, no guarding, no rebound; there is mild CVA tenderness b/l R>L ; no lower ext edema/ tenderness    1.  UTI r/o pyelonephritis: will c/w ceftriaxone as pt failed bactrim therapy, follow up ctxs, pain control w/ toradol prn; appears euvolemic currently ; compare previous CT reports w/ one done at [facility] ED                                                 2.   monitor BMP in setting of AG metabolic acidosis s/p IV fluid boluses  3.   constipation: plan as above  4.  monitor cbc in setting of eosinophilia patient seen and examined; pt reports being treated with cipro for UTI sxs last month; sxs recurred on Tuesday worsening on way back from in-laws place (went for Thanksgiving) prompting pt to be evaluated on way back to [facility]. Pt reports having elevated WBC, underwent CT scan and told of having UTI, dcd on bactrim. Pain relieved w/ motrin. Pt started abx on Saturday but pain recurred next day. pt reports having renal ultrasound done twice in past, no mention of L upper pole kidney lesion on previous reports per pt recall.  Pt last  took bactrim ds in ED.                                                                                                                                                                       reviewed vs, labs, CT a/p report  LMP: on friday    agree w/ PE findings as above w/ additions/ exceptions/ pertinent findings: pt appears uncomfortable  but able to talk in full sentences; MMM, perrla, no photophobia b/l; +generalized abdominal discomfort noted on abdominal examination, no guarding, no rebound; there is mild CVA tenderness b/l R>L ; no lower ext edema/ tenderness    1.  UTI r/o pyelonephritis: will c/w ceftriaxone as pt failed bactrim therapy, follow up ctxs, pain control w/ toradol prn; appears euvolemic currently ; compare previous CT reports w/ one done at [facility] ED                                                 2.   monitor BMP in setting of AG metabolic acidosis s/p IV fluid boluses  3.   constipation: plan as above  4.  monitor cbc in setting of eosinophilia

## 2017-11-28 NOTE — DISCHARGE NOTE ADULT - PROVIDER TOKENS
TOKEN:'[id number]',FREE:[LAST:[Hubert Ann],PHONE:[phone]:[(   [phone]    -],ADDRESS:[[address] [place] [address] at [address] [place] [address]]]

## 2017-11-28 NOTE — H&P ADULT - PROBLEM SELECTOR PLAN 2
Pt with chronic constipation that has been worked up in the past. Passing flatus, last BM this AM. CT A/P showing copious colonic stool with small bowel fecalization compatible with constipation/stasis.  - Mag citrate and senna daily  - will add additional agents (dulcolax, miralax if pt continues to be constipated) History consistent with pyelonephritis with UTI and CVA tenderness but pt met 0/4 SIRS criteria, WBC wnl, UA negative and CT A/P preliminary read is negative for acute pyelonephritis, kidney stones, or hydronephrosis. Could be musculoskeletal pain, no alarming signs  - will continue to treat with Ceftriaxone 1g q 24hrs for suspicion of pyelonephritis  - will treat pain with lidocaine patch and Toradol 15mg IV q6hrs PRN  - avoid opioids in setting of constipation  - f/u Urine Cx for speciation and sensitivities in setting of decreased PO intake s/p IVF boluses in ED; monitor BMP

## 2017-11-28 NOTE — DISCHARGE NOTE ADULT - MEDICATION SUMMARY - MEDICATIONS TO TAKE
I will START or STAY ON the medications listed below when I get home from the hospital:    Dulcolax Laxative 5 mg oral delayed release tablet  -- 1 tab(s) by mouth 2 times a day, As Needed   -- Swallow whole.  Do not crush.    -- Indication: For Constipation    MiraLax oral powder for reconstitution  -- 17 gram(s) by mouth 2 times a day   -- Dilute this medication with liquid before administration.  It is very important that you take or use this exactly as directed.  Do not skip doses or discontinue unless directed by your doctor.    -- Indication: For Constipation    Bactrim  mg-160 mg oral tablet  -- 1 tab(s) by mouth 2 times a day  -- Indication: For UTI (urinary tract infection)    phenazopyridine  -- Indication: For UTI (urinary tract infection)    Microgestin 1.5/30 oral tablet  -- 1 tab(s) by mouth once a day  -- Indication: For OCP

## 2017-11-28 NOTE — H&P ADULT - HISTORY OF PRESENT ILLNESS
33 yo F with PMHx of chronic constipation presents to ED with worsening back pain x 3 days. Pt began to experience increased urinary frequency and urgency about a week ago then on Friday, she began to experience back pain accompanying the above symptoms and intermittent chills, so she went to [facility] in [place] on Saturday, where she had CT abdomen. It is unclear whether the CT scan showed pyelonephritis or not, she was discharged from the ED on Bactrim and phenazopyridine. This morning, her back pain became excruciating, 10/10, non-radiating, constant, with no alleviating or exacerbating factors, which prompted her to go to the ED. She also endorses decreased appetite, PO intake, nausea, and some dry heaving, but no vomiting. Pt denies fevers, weight loss, trauma, dysuria, or hematuria.   Regarding her chronic constipation for the past 4-5 years, pt states that she has been worked up in the past with GI specialist, was negative for lactose intolerance and celiac disease. She takes Magnesium daily and keeps diet high in fiber but her BM frequency varies from 2-3 BMs/day to once every 5-6 days. Her last BM was this morning, small caliber, soft, denies melena or hematochezia.     In ED, pt afebrile, VSS, pt received 2L NS, Dilaudid 1mg IV, morphine 4mg IV, zofran 4mg IV x1, and ceftriaxone 2g x 1. 33 yo F with PMHx of chronic constipation presents to ED with worsening back pain x 3 days. Pt began to experience increased urinary frequency and urgency about a week ago then on Friday, she began to experience back pain accompanying the above symptoms and intermittent chills, so she went to [facility] in [place] on Saturday, where she had CT abdomen. It is unclear whether the CT scan showed pyelonephritis or not, she was discharged from the ED on Bactrim and phenazopyridine. This morning, her back pain became excruciating, 10/10, non-radiating, constant, with no alleviating or exacerbating factors, which prompted her to go to the ED. She also endorses decreased appetite, PO intake, nausea, and some dry heaving, but no vomiting. Pt denies fevers, weight loss, trauma, dysuria, or hematuria. Of note, pt was also treated for UTI a month ago with Cipro.  Regarding her chronic constipation for the past 4-5 years, pt states that she has been worked up in the past with GI specialist, was negative for lactose intolerance and celiac disease. She takes Magnesium daily and keeps diet high in fiber but her BM frequency varies from 2-3 BMs/day to once every 5-6 days. Her last BM was this morning, small caliber, soft, denies melena or hematochezia.     In ED, pt afebrile, VSS, pt received 2L NS, Dilaudid 1mg IV, morphine 4mg IV, zofran 4mg IV x1, and ceftriaxone 2g x 1.

## 2017-11-29 LAB
CULTURE RESULTS: NO GROWTH — SIGNIFICANT CHANGE UP
SPECIMEN SOURCE: SIGNIFICANT CHANGE UP

## 2017-11-30 DIAGNOSIS — E87.2 ACIDOSIS: ICD-10-CM

## 2017-11-30 DIAGNOSIS — N12 TUBULO-INTERSTITIAL NEPHRITIS, NOT SPECIFIED AS ACUTE OR CHRONIC: ICD-10-CM

## 2017-11-30 DIAGNOSIS — D72.1 EOSINOPHILIA: ICD-10-CM

## 2017-11-30 DIAGNOSIS — N28.9 DISORDER OF KIDNEY AND URETER, UNSPECIFIED: ICD-10-CM

## 2017-11-30 DIAGNOSIS — N30.80 OTHER CYSTITIS WITHOUT HEMATURIA: ICD-10-CM

## 2017-11-30 DIAGNOSIS — K59.09 OTHER CONSTIPATION: ICD-10-CM

## 2017-12-03 LAB
CULTURE RESULTS: SIGNIFICANT CHANGE UP
CULTURE RESULTS: SIGNIFICANT CHANGE UP
SPECIMEN SOURCE: SIGNIFICANT CHANGE UP
SPECIMEN SOURCE: SIGNIFICANT CHANGE UP

## 2025-04-23 NOTE — ED ADULT NURSE NOTE - NS ED NURSE DISCH DISPOSITION
Population Health Chart Review & Patient Outreach Details    Outreach Performed: YES Portal Active and/or Letter inactive    Additional [facility] Notes:    BREAST CANCER SCREENING    Non-compliant report chart audits for BREAST CANCER SCREENING     Outreach to patient in reference to SCHEDULING A MAMMOGRAM EXAM.            Updates Requested / Reviewed:               Health Maintenance Topics Overdue:      VBHM Score: 2     Cervical Cancer Screening  Mammogram    Influenza Vaccine  Pneumonia Vaccine  Shingles/Zoster Vaccine                            
Admitted